# Patient Record
(demographics unavailable — no encounter records)

---

## 2024-12-18 NOTE — HISTORY OF PRESENT ILLNESS
[de-identified] : productive cough, cough is keeping her up HS, no nasal congestion, afebrile, no wheezing, not in distress, pt is not asthmatic [FreeTextEntry6] : Cough and congestion on and of this month.  For the past 2 days, cough is barky and she seems more tired.  No fevers. ALbuterol was given last night which helped a little.

## 2024-12-18 NOTE — REVIEW OF SYSTEMS
[Fever] : no fever [Malaise] : malaise [Headache] : no headache [Ear Pain] : no ear pain [Nasal Congestion] : no nasal congestion [Sore Throat] : no sore throat [Cough] : cough [Shortness of Breath] : no shortness of breath [Negative] : Skin